# Patient Record
Sex: FEMALE | Race: BLACK OR AFRICAN AMERICAN | Employment: FULL TIME | URBAN - METROPOLITAN AREA
[De-identification: names, ages, dates, MRNs, and addresses within clinical notes are randomized per-mention and may not be internally consistent; named-entity substitution may affect disease eponyms.]

---

## 2022-06-23 ENCOUNTER — OFFICE VISIT (OUTPATIENT)
Dept: ORTHOPEDIC SURGERY | Age: 57
End: 2022-06-23
Payer: COMMERCIAL

## 2022-06-23 VITALS — HEIGHT: 72 IN | WEIGHT: 229.2 LBS | TEMPERATURE: 97.3 F | BODY MASS INDEX: 31.04 KG/M2

## 2022-06-23 DIAGNOSIS — G89.29 CHRONIC PAIN OF BOTH KNEES: ICD-10-CM

## 2022-06-23 DIAGNOSIS — M17.11 PRIMARY OSTEOARTHRITIS OF RIGHT KNEE: ICD-10-CM

## 2022-06-23 DIAGNOSIS — M25.561 CHRONIC PAIN OF BOTH KNEES: ICD-10-CM

## 2022-06-23 DIAGNOSIS — M25.562 CHRONIC PAIN OF BOTH KNEES: ICD-10-CM

## 2022-06-23 DIAGNOSIS — M17.12 PRIMARY OSTEOARTHRITIS OF LEFT KNEE: Primary | ICD-10-CM

## 2022-06-23 PROCEDURE — 20611 DRAIN/INJ JOINT/BURSA W/US: CPT | Performed by: ORTHOPAEDIC SURGERY

## 2022-06-23 PROCEDURE — 99203 OFFICE O/P NEW LOW 30 MIN: CPT | Performed by: ORTHOPAEDIC SURGERY

## 2022-06-23 PROCEDURE — 73560 X-RAY EXAM OF KNEE 1 OR 2: CPT | Performed by: ORTHOPAEDIC SURGERY

## 2022-06-23 RX ORDER — TRIAMCINOLONE ACETONIDE 40 MG/ML
80 INJECTION, SUSPENSION INTRA-ARTICULAR; INTRAMUSCULAR ONCE
Status: COMPLETED | OUTPATIENT
Start: 2022-06-23 | End: 2022-06-23

## 2022-06-23 RX ORDER — MELOXICAM 15 MG/1
15 TABLET ORAL
Qty: 30 TABLET | Refills: 1 | Status: SHIPPED | OUTPATIENT
Start: 2022-06-23

## 2022-06-23 RX ADMIN — TRIAMCINOLONE ACETONIDE 80 MG: 40 INJECTION, SUSPENSION INTRA-ARTICULAR; INTRAMUSCULAR at 11:36

## 2022-06-23 NOTE — PATIENT INSTRUCTIONS
Knee: Exercises  Introduction  Here are some examples of exercises for you to try. The exercises may be suggested for a condition or for rehabilitation. Start each exercise slowly. Ease off the exercises if you start to have pain. You will be told when to start these exercises and which ones will work best for you. How to do the exercises  Quad sets    1. Sit with your leg straight and supported on the floor or a firm bed. (If you feel discomfort in the front or back of your knee, place a small towel roll under your knee.)  2. Tighten the muscles on top of your thigh by pressing the back of your knee flat down to the floor. (If you feel discomfort under your kneecap, place a small towel roll under your knee.)  3. Hold for about 6 seconds, then rest for up to 10 seconds. 4. Do 8 to 12 repetitions several times a day. Straight-leg raises to the front    1. Lie on your back with your good knee bent so that your foot rests flat on the floor. Your injured leg should be straight. Make sure that your low back has a normal curve. You should be able to slip your flat hand in between the floor and the small of your back, with your palm touching the floor and your back touching the back of your hand. 2. Tighten the thigh muscles in the injured leg by pressing the back of your knee flat down to the floor. Hold your knee straight. 3. Keeping the thigh muscles tight, lift your injured leg up so that your heel is about 12 inches off the floor. Hold for about 6 seconds and then lower slowly. 4. Do 8 to 12 repetitions, 3 times a day. Straight-leg raises to the outside    1. Lie on your side, with your injured leg on top. 2. Tighten the front thigh muscles of your injured leg to keep your knee straight. 3. Keep your hip and your leg straight in line with the rest of your body, and keep your knee pointing forward. Do not drop your hip back.   4. Lift your injured leg straight up toward the ceiling, about 12 inches off the floor. Hold for about 6 seconds, then slowly lower your leg. 5. Do 8 to 12 repetitions. Straight-leg raises to the back    1. Lie on your stomach, and lift your leg straight up behind you (toward the ceiling). 2. Lift your toes about 6 inches off the floor, hold for about 6 seconds, then lower slowly. 3. Do 8 to 12 repetitions. Straight-leg raises to the inside    1. Lie on the side of your body with the injured leg. 2. You can either prop your other (good) leg up on a chair, or you can bend your good knee and put that foot in front of your injured knee. Do not drop your hip back. 3. Tighten the muscles on the front of your thigh to straighten your injured knee. 4. Keep your kneecap pointing forward, and lift your whole leg up toward the ceiling about 6 inches. Hold for about 6 seconds, then lower slowly. 5. Do 8 to 12 repetitions. Heel dig bridging    1. Lie on your back with both knees bent and your ankles bent so that only your heels are digging into the floor. Your knees should be bent about 90 degrees. 2. Then push your heels into the floor, squeeze your buttocks, and lift your hips off the floor until your shoulders, hips, and knees are all in a straight line. 3. Hold for about 6 seconds as you continue to breathe normally, and then slowly lower your hips back down to the floor and rest for up to 10 seconds. 4. Do 8 to 12 repetitions. Hamstring curls    1. Lie on your stomach with your knees straight. If your kneecap is uncomfortable, roll up a washcloth and put it under your leg just above your kneecap. 2. Lift the foot of your injured leg by bending the knee so that you bring the foot up toward your buttock. If this motion hurts, try it without bending your knee quite as far. This may help you avoid any painful motion. 3. Slowly lower your leg back to the floor. 4. Do 8 to 12 repetitions.   5. With permission from your doctor or physical therapist, you may also want to add a cuff weight to your ankle (not more than 5 pounds). With weight, you do not have to lift your leg more than 12 inches to get a hamstring workout. Shallow standing knee bends    Do this exercise only if you have very little pain; if you have no clicking, locking, or giving way if you have an injured knee; and if it does not hurt while you are doing 8 to 12 repetitions. 1. Stand with your hands lightly resting on a counter or chair in front of you. Put your feet shoulder-width apart. 2. Slowly bend your knees so that you squat down like you are going to sit in a chair. Make sure your knees do not go in front of your toes. 3. Lower yourself about 6 inches. Your heels should remain on the floor at all times. 4. Rise slowly to a standing position. Heel raises    1. Stand with your feet a few inches apart, with your hands lightly resting on a counter or chair in front of you. 2. Slowly raise your heels off the floor while keeping your knees straight. 3. Hold for about 6 seconds, then slowly lower your heels to the floor. 4. Do 8 to 12 repetitions several times during the day. Follow-up care is a key part of your treatment and safety. Be sure to make and go to all appointments, and call your doctor if you are having problems. It's also a good idea to know your test results and keep a list of the medicines you take. Where can you learn more? Go to http://www.gray.com/  Enter Z594 in the search box to learn more about \"Knee: Exercises. \"  Current as of: July 1, 2021               Content Version: 13.2  © 1443-1220 [employer]. Care instructions adapted under license by [employer] (which disclaims liability or warranty for this information). If you have questions about a medical condition or this instruction, always ask your healthcare professional. [address] any warranty or liability for your use of this information.

## 2022-06-23 NOTE — PROGRESS NOTES
Michel Crowell  1965   Chief Complaint   Patient presents with    Knee Pain     humberto        HISTORY OF PRESENT ILLNESS  Michel Crowell is a 56 y.o. female who presents today for evaluation of b/l knee pain. L>R. She rates her pain 10/10 today. Pain has been present for 6 months. She reports hx of previous right knee scope in 2014 by a doctor in [place]. Has had two prior surgeries on the right knee. Pain has been progressively worsening. She notes swelling in both knees, worse on left. She notes difficulty with sitting, walking and sleeping at night due to left knee pain. She has tried taking Tylenol arthritis without relief. Has tried following treatments: Injections:NO; Brace:NO; Therapy:NO; Cane/Crutch:NO       Allergies   Allergen Reactions    Pollen Extracts Itching        History reviewed. No pertinent past medical history. Social History     Socioeconomic History    Marital status: SINGLE     Spouse name: Not on file    Number of children: Not on file    Years of education: Not on file    Highest education level: Not on file   Occupational History    Not on file   Tobacco Use    Smoking status: Never Smoker    Smokeless tobacco: Never Used   Substance and Sexual Activity    Alcohol use: No    Drug use: Never    Sexual activity: Not on file   Other Topics Concern    Not on file   Social History Narrative    Not on file     Social Determinants of Health     Financial Resource Strain:     Difficulty of Paying Living Expenses: Not on file   Food Insecurity:     Worried About Running Out of Food in the Last Year: Not on file    Betsey of Food in the Last Year: Not on file   Transportation Needs:     Lack of Transportation (Medical): Not on file    Lack of Transportation (Non-Medical):  Not on file   Physical Activity:     Days of Exercise per Week: Not on file    Minutes of Exercise per Session: Not on file   Stress:     Feeling of Stress : Not on file   Social Connections:     Frequency of Communication with Friends and Family: Not on file    Frequency of Social Gatherings with Friends and Family: Not on file    Attends Faith Services: Not on file    Active Member of Clubs or Organizations: Not on file    Attends Club or Organization Meetings: Not on file    Marital Status: Not on file   Intimate Partner Violence:     Fear of Current or Ex-Partner: Not on file    Emotionally Abused: Not on file    Physically Abused: Not on file    Sexually Abused: Not on file   Housing Stability:     Unable to Pay for Housing in the Last Year: Not on file    Number of [place] in the Last Year: Not on file    Unstable Housing in the Last Year: Not on file      Past Surgical History:   Procedure Laterality Date    HX KNEE ARTHROSCOPY        History reviewed. No pertinent family history. Current Outpatient Medications   Medication Sig    HYDROcodone-acetaminophen (VICODIN ES) 7.5-750 mg per tablet Take  by mouth every six (6) hours as needed. (Patient not taking: Reported on 6/23/2022)    oxyCODONE (OXYIR) 5 mg capsule 1-3 tabs po q4h prn pain. (Patient not taking: Reported on 6/23/2022)    polyethylene glycol (MIRALAX) 17 gram/dose powder Take 17 g by mouth daily. 1 tablespoon with 8 oz of water daily (Patient not taking: Reported on 6/23/2022)    promethazine (PHENERGAN) 25 mg tablet Take 1 Tab by mouth every six (6) hours as needed for Nausea. Caution: This medication may make you drowsy. Avoid driving while under the influence of this medication. (Patient not taking: Reported on 6/23/2022)     No current facility-administered medications for this visit. REVIEW OF SYSTEM   Patient denies: Weight loss, Fever/Chills, HA, Visual changes, Fatigue, Chest pain, SOB, Abdominal pain, N/V/D/C, Blood in stool or urine, Edema. Pertinent positive as above in HPI.  All others were negative    PHYSICAL EXAM:   Visit Vitals  Temp 97.3 °F (36.3 °C) (Temporal)   Ht 6' (1.829 m)   Wt 229 lb 3.2 oz (104 kg)   BMI 31.09 kg/m²     The patient is a well-developed, well-nourished female   in no acute distress. The patient is alert and oriented times three. The patient is alert and oriented times three. Mood and affect are normal.  LYMPHATIC: lymph nodes are not enlarged and are within normal limits  SKIN: normal in color and non tender to palpation. There are no bruises or abrasions noted. NEUROLOGICAL: Motor sensory exam is within normal limits. Reflexes are equal bilaterally. There is normal sensation to pinprick and light touch  MUSCULOSKELETAL:   Examination Left knee Right knee   Skin Intact Intact   Range of motion     Effusion + +   Medial joint line tenderness + +   Lateral joint line tenderness + +   Tenderness Pes Bursa - -   Tenderness insertion MCL - -   Tenderness insertion LCL - -   Bebes - -   Patella crepitus + +   Patella grind + +   Lachman - -   Pivot shift - -   Anterior drawer - -   Posterior drawer - -   Varus stress - -   Valgus stress - -   Neurovascular Intact Intact   Calf Swelling and Tenderness to Palpation - -   Oksana's Test - -   Hamstring Cord Tightness - -        PROCEDURE:   .Left Knee Aspiration and Injection with Ultrasound Guidance    Indication:Left Knee pain/swelling    After sterile prep, left knee was aspirated. 30 cc of clear fluid were obtained. The fluid was discarded. After sterile prep, 4 cc of Xylocaine and 1 cc of Kenalog were injected into the left Knee. Intra-articular Ultrasound images captured using [employer] Ultrasound machine with a frequency of 10 MHz with a linear transducer and scanned into patient's chart. Right Knee Injection with Ultrasound Guidance    Indication:Right Knee pain/swelling    After sterile prep, 4 cc of Xylocaine and 1 cc of Kenalog were injected into the right Knee.  Intra-articular Ultrasound images captured using [email or web address] Ultrasound machine using a frequency of 10 MHz with a linear transducer and scanned into patient's chart. [facility] - [facility]  OFFICE PROCEDURE PROGRESS NOTE        Chart reviewed for the following:  Lynn Enriquez M.D, have reviewed the History, Physical and updated the Allergic reactions for [place] performed immediately prior to start of procedure:  Lynn Enriquez M.D, have performed the following reviews on [address] [place] [address],[address] prior to the start of the procedure:            * Patient was identified by name and date of birth   * Agreement on procedure being performed was verified  * Risks and Benefits explained to the patient  * Procedure site verified and marked as necessary  * Patient was positioned for comfort  * Needle placement confirmed by ultrasound  * Consent was signed and verified     Time: 11:12 AM     Date of procedure: 6/23/2022    Procedure performed by:  Jeffery Ramírez M.D    Provider assisted by: (see medication administration)    How tolerated by patient: tolerated the procedure well with no complications    Comments: none      IMAGING: XR of the left knee with 2 views obtained in the office dated 6/23/2022 was reviewed and read by Dr. Jorge Lopez: Marked degenerative changes in all 3 compartments with marked varus angulation, much worse on right side    XR of the right knee with 2 views obtained in the office dated 6/23/2022 was reviewed and read by Dr. Jorge Lopez: Marked degenerative changes in all 3 compartments with marked varus angulation, much worse on right side      IMPRESSION:      ICD-10-CM ICD-9-CM    1. Primary osteoarthritis of left knee  M17.12 715.16    2. Chronic pain of both knees  M25.561 719.46 AMB POC XRAY, KNEE; 1/2 VIEWS    M25.562 338.29 AMB POC XRAY, KNEE; 1/2 VIEWS    G89.29     3. Primary osteoarthritis of right knee  M17.11 715.16         PLAN:  1.  Pt presents today with b/l knee pain and swelling, L>R, due to primary OA and I would like to try injecting both knees with cortisone. A left knee aspiration was also performed in the office today. Patient was provided with physician-directed home exercise program in the office today. Risk factors include: BMI>30  2. No ultrasound exam indicated today  3. Yes cortisone injection indicated today B/L KNEE US & L KNEE ASPIRATION  4. No Physical/Occupational Therapy indicated today  5. No diagnostic test indicated today:   6. No durable medical equipment indicated today  7. No referral indicated today   8. Yes medications indicated today: MOBIC  9. No Narcotic indicated today       RTC 3 weeks if pain continues      Scribed by Aleks Zapata) as dictated by MD BRANDY Patel, Dr. Cordelia Henriquez, confirm that all documentation is accurate.     Cordelia Henriquez M.D.   [facility]

## 2022-07-19 ENCOUNTER — OFFICE VISIT (OUTPATIENT)
Dept: ORTHOPEDIC SURGERY | Age: 57
End: 2022-07-19
Payer: COMMERCIAL

## 2022-07-19 VITALS
TEMPERATURE: 97.8 F | SYSTOLIC BLOOD PRESSURE: 138 MMHG | WEIGHT: 234 LBS | HEIGHT: 72 IN | BODY MASS INDEX: 31.69 KG/M2 | HEART RATE: 84 BPM | OXYGEN SATURATION: 100 % | DIASTOLIC BLOOD PRESSURE: 61 MMHG

## 2022-07-19 DIAGNOSIS — M17.11 PRIMARY OSTEOARTHRITIS OF RIGHT KNEE: ICD-10-CM

## 2022-07-19 DIAGNOSIS — M17.12 PRIMARY OSTEOARTHRITIS OF LEFT KNEE: Primary | ICD-10-CM

## 2022-07-19 PROCEDURE — 99212 OFFICE O/P EST SF 10 MIN: CPT | Performed by: ORTHOPAEDIC SURGERY

## 2022-07-19 NOTE — PROGRESS NOTES
Aldo Lozoya  1965   Chief Complaint   Patient presents with    Knee Pain     left/right        HISTORY OF PRESENT ILLNESS  Aldo Lozoya is a 56 y.o. female who presents today for reevaluation of b/l knee. Patient rates pain as 0/10 today. At last OV on 6/23/2022, patient had a left knee aspiration and b/l knee cortisone injection which provided great relief. Notes her knees are doing a lot better today. She has started walking more and is able to sleep better. She has tried taking Tylenol arthritis previously without relief. She reports hx of previous right knee scope in 2014 by a doctor in [place]. Has had two prior surgeries on the right knee. Patient denies any fever, chills, chest pain, shortness of breath or calf pain. The remainder of the review of systems is negative. There are no new illness or injuries to report since last seen in the office. There are no changes to medications, allergies, family or social history. PHYSICAL EXAM:   Visit Vitals  /61 (BP 1 Location: Right arm, BP Patient Position: Sitting, BP Cuff Size: Adult)   Pulse 84   Temp 97.8 °F (36.6 °C)   Ht 6' (1.829 m)   Wt 234 lb (106.1 kg)   SpO2 100%   BMI 31.74 kg/m²     The patient is a well-developed, well-nourished female   in no acute distress. The patient is alert and oriented times three. The patient is alert and oriented times three. Mood and affect are normal.  LYMPHATIC: lymph nodes are not enlarged and are within normal limits  SKIN: normal in color and non tender to palpation. There are no bruises or abrasions noted. NEUROLOGICAL: Motor sensory exam is within normal limits. Reflexes are equal bilaterally.  There is normal sensation to pinprick and light touch  MUSCULOSKELETAL:  Examination Left knee Right knee   Skin Intact Intact   Range of motion     Effusion + +   Medial joint line tenderness + +   Lateral joint line tenderness + +   Tenderness Pes Bursa - -   Tenderness insertion MCL - -   Tenderness insertion LCL - -   Bebes - -   Patella crepitus + +   Patella grind + +   Lachman - -   Pivot shift - -   Anterior drawer - -   Posterior drawer - -   Varus stress - -   Valgus stress - -   Neurovascular Intact Intact   Calf Swelling and Tenderness to Palpation - -   Oksana's Test - -   Hamstring Cord Tightness - -         IMAGING: XR of the left knee with 2 views obtained in the office dated 6/23/2022 was reviewed and read by Dr. Fernandez Call: Marked degenerative changes in all 3 compartments with marked varus angulation, much worse on right side     XR of the right knee with 2 views obtained in the office dated 6/23/2022 was reviewed and read by Dr. Fernandez Call: Marked degenerative changes in all 3 compartments with marked varus angulation, much worse on right side      IMPRESSION:      ICD-10-CM ICD-9-CM    1. Primary osteoarthritis of left knee  M17.12 715.16    2. Primary osteoarthritis of right knee  M17.11 715.16         PLAN:   1. Pt presents today with b/l knee pain due to primary OA and she notes overall relief with the b/l knee cortisone injections given at last OV. She is doing well today and can return as needed. Risk factors include: BMI>30  2. No ultrasound exam indicated today  3. No cortisone injection indicated today   4. No Physical/Occupational Therapy indicated today  5. No diagnostic test indicated today:   6. No durable medical equipment indicated today  7. No referral indicated today   8. No medications indicated today:   9. No Narcotic indicated today       RTC prn      Scribed by [address]) as dictated by Rohan Guzmán MD    I, Dr. Rohan Guzmán, confirm that all documentation is accurate.     Rohan Guzmán M.D.   [facility]

## 2022-12-06 NOTE — PROGRESS NOTES
Liat Zhao  1965   No chief complaint on file. HISTORY OF PRESENT ILLNESS  Liat Zhao is a 56 y.o. female who presents today for reevaluation of b/l knee. Patient rates pain as 8/10 today. Pain has worsened in the last month. No new injury. At [facility] on 6/23/2022, patient had a left knee aspiration and b/l knee cortisone injection which provided great relief. She has tried taking Tylenol arthritis previously without relief. She reports hx of previous right knee scope in 2014 by a doctor in [place]. Has had two prior surgeries on the right knee. Patient denies any fever, chills, chest pain, shortness of breath or calf pain. The remainder of the review of systems is negative. There are no new illness or injuries to report since last seen in the office. There are no changes to medications, allergies, family or social history. PHYSICAL EXAM:   There were no vitals taken for this visit. The patient is a well-developed, well-nourished female   in no acute distress. The patient is alert and oriented times three. The patient is alert and oriented times three. Mood and affect are normal.  LYMPHATIC: lymph nodes are not enlarged and are within normal limits  SKIN: normal in color and non tender to palpation. There are no bruises or abrasions noted. NEUROLOGICAL: Motor sensory exam is within normal limits. Reflexes are equal bilaterally.  There is normal sensation to pinprick and light touch  MUSCULOSKELETAL:  Examination Left knee Right knee   Skin Intact Intact   Range of motion     Effusion + +   Medial joint line tenderness + +   Lateral joint line tenderness + +   Tenderness Pes Bursa - -   Tenderness insertion MCL - -   Tenderness insertion LCL - -   Bebes - -   Patella crepitus + +   Patella grind + +   Lachman - -   Pivot shift - -   Anterior drawer - -   Posterior drawer - -   Varus stress - -   Valgus stress - -   Neurovascular Intact Intact   Calf Swelling and Tenderness to Palpation - -   Oksana's Test - -   Hamstring Cord Tightness - -      PROCEDURE:   Bilateral knee Injection with Ultrasound Guidance    Indication:Bilateral Knee pain/swelling    After sterile prep, 4 cc of Xylocaine and 1 cc of Kenalog were injected into the bilateral  Knee. Intra-articular Ultrasound images captured using [address] Ultrasound machine using a frequency of 10 MHz with a linear transducer and scanned into patient's chart.            [facility] - [facility]  OFFICE PROCEDURE PROGRESS NOTE        Chart reviewed for the following:  Raad Valdez M.D, have reviewed the History, Physical and updated the Allergic reactions for [place] performed immediately prior to start of procedure:  Raad Valdez M.D, have performed the following reviews on [address] [place] [address],[address] prior to the start of the procedure:            * Patient was identified by name and date of birth   * Agreement on procedure being performed was verified  * Risks and Benefits explained to the patient  * Procedure site verified and marked as necessary  * Patient was positioned for comfort  * Needle placement confirmed by ultrasound  * Consent was signed and verified     Time: 4:40 PM     Date of procedure: 12/8/2022    Procedure performed by:  Terry Shook M.D    Provider assisted by: (see medication administration)    How tolerated by patient: tolerated the procedure well with no complications    Comments: none       IMAGING: XR of the left knee with 2 views obtained in the office dated 6/23/2022 was reviewed and read by Dr. Betsy Mendez: Marked degenerative changes in all 3 compartments with marked varus angulation, much worse on right side     XR of the right knee with 2 views obtained in the office dated 6/23/2022 was reviewed and read by Dr. Betsy Mendez: Marked degenerative changes in all 3 compartments with marked varus angulation, much worse on right side      IMPRESSION:      ICD-10-CM ICD-9-CM    1. Primary osteoarthritis of left knee  M17.12 715.16 triamcinolone acetonide (KENALOG-40) 40 mg/mL injection 40 mg      ARTHROCENTESIS ASPIR&/INJ MAJOR JT/BURSA W/US      2. Primary osteoarthritis of right knee  M17.11 715.16 triamcinolone acetonide (KENALOG-40) 40 mg/mL injection 40 mg      ARTHROCENTESIS ASPIR&/INJ MAJOR JT/BURSA W/US             PLAN:   1. Pt presents today with b/l knee pain due to primary OA. Discussed treatment options with the patient . Will inject bilateral knees with cortisone today. Risk factors include: BMI>30  2. No ultrasound exam indicated today  3. Yes  cortisone injection indicated today   4. No Physical/Occupational Therapy indicated today  5. No diagnostic test indicated today:   6. No durable medical equipment indicated today  7. No referral indicated today   8. No medications indicated today:   9. No Narcotic indicated today       RTC 3 weeks if pain persists      Scribed by Jil Hernandez) as dictated by May Monahan MD    I, Dr. May Monahan, confirm that all documentation is accurate.     May Monahan M.D.   [facility]

## 2022-12-07 ENCOUNTER — OFFICE VISIT (OUTPATIENT)
Dept: ORTHOPEDIC SURGERY | Age: 57
End: 2022-12-07
Payer: COMMERCIAL

## 2022-12-07 DIAGNOSIS — M17.12 PRIMARY OSTEOARTHRITIS OF LEFT KNEE: Primary | ICD-10-CM

## 2022-12-07 DIAGNOSIS — M17.11 PRIMARY OSTEOARTHRITIS OF RIGHT KNEE: ICD-10-CM

## 2022-12-07 RX ORDER — TRIAMCINOLONE ACETONIDE 40 MG/ML
40 INJECTION, SUSPENSION INTRA-ARTICULAR; INTRAMUSCULAR ONCE
Status: COMPLETED | OUTPATIENT
Start: 2022-12-07 | End: 2022-12-07

## 2022-12-07 RX ADMIN — TRIAMCINOLONE ACETONIDE 40 MG: 40 INJECTION, SUSPENSION INTRA-ARTICULAR; INTRAMUSCULAR at 16:56
